# Patient Record
Sex: FEMALE
[De-identification: names, ages, dates, MRNs, and addresses within clinical notes are randomized per-mention and may not be internally consistent; named-entity substitution may affect disease eponyms.]

---

## 2019-06-25 ENCOUNTER — APPOINTMENT (OUTPATIENT)
Dept: DERMATOLOGY | Facility: CLINIC | Age: 24
End: 2019-06-25

## 2019-07-23 ENCOUNTER — APPOINTMENT (OUTPATIENT)
Dept: DERMATOLOGY | Facility: CLINIC | Age: 24
End: 2019-07-23
Payer: COMMERCIAL

## 2019-07-23 ENCOUNTER — TRANSCRIPTION ENCOUNTER (OUTPATIENT)
Age: 24
End: 2019-07-23

## 2019-07-23 VITALS
HEIGHT: 63 IN | WEIGHT: 171 LBS | SYSTOLIC BLOOD PRESSURE: 100 MMHG | BODY MASS INDEX: 30.3 KG/M2 | DIASTOLIC BLOOD PRESSURE: 80 MMHG

## 2019-07-23 DIAGNOSIS — Z91.89 OTHER SPECIFIED PERSONAL RISK FACTORS, NOT ELSEWHERE CLASSIFIED: ICD-10-CM

## 2019-07-23 DIAGNOSIS — Z78.9 OTHER SPECIFIED HEALTH STATUS: ICD-10-CM

## 2019-07-23 PROCEDURE — 99203 OFFICE O/P NEW LOW 30 MIN: CPT | Mod: 25

## 2019-07-23 PROCEDURE — 11900 INJECT SKIN LESIONS </W 7: CPT

## 2019-08-20 ENCOUNTER — APPOINTMENT (OUTPATIENT)
Dept: DERMATOLOGY | Facility: CLINIC | Age: 24
End: 2019-08-20
Payer: COMMERCIAL

## 2019-08-20 PROCEDURE — 11900 INJECT SKIN LESIONS </W 7: CPT

## 2019-08-20 PROCEDURE — 99213 OFFICE O/P EST LOW 20 MIN: CPT | Mod: 25

## 2019-10-10 ENCOUNTER — APPOINTMENT (OUTPATIENT)
Dept: DERMATOLOGY | Facility: CLINIC | Age: 24
End: 2019-10-10

## 2019-10-31 ENCOUNTER — APPOINTMENT (OUTPATIENT)
Dept: DERMATOLOGY | Facility: CLINIC | Age: 24
End: 2019-10-31
Payer: COMMERCIAL

## 2019-10-31 PROCEDURE — 11900 INJECT SKIN LESIONS </W 7: CPT

## 2019-10-31 PROCEDURE — 99213 OFFICE O/P EST LOW 20 MIN: CPT | Mod: 25

## 2019-10-31 NOTE — HISTORY OF PRESENT ILLNESS
[FreeTextEntry1] : new pt keloid pubic [de-identified] : MARY ALDRICH is a 24 year old F here for:\par \par f/u keloid on mons pubis, s/p kenalog injections X 2 sessions with significant improvement. Has noted hypopigmentation of the area on R groin fold

## 2019-10-31 NOTE — ASSESSMENT
[FreeTextEntry1] : 1) Keloid x 2 in pubic area\par - injection #3\par - Education and anticipatory guidance provided. Discussed risks and benefits of serial injections \par -Large keloid and small keloid: Intralesional injection of Kenalog 40 mg/mL (Lot [id number], EXP 8/2020) A total of 1.3 ml was injected (1.0 cc to large lesion and 0.3 to focal area on R groin fold)\par The risks/benefits/alternatives of intralesional steroid injections were reviewed with the patient which include but are not limited to pain, atrophy, and dispigmentation. Intralesional injections were performed in the affected area. The patient tolerated the procedure well.\par -May use EMLA 1 hour prior to appointment for next injection\par \par 2. Hypopigmentation\par due to Kenalog, will improve with time\par \par RTC in 4 weeks\par

## 2019-10-31 NOTE — PHYSICAL EXAM
[Alert] : alert [Oriented x 3] : ~L oriented x 3 [Well Nourished] : well nourished [Conjunctiva Non-injected] : conjunctiva non-injected [No Visual Lymphadenopathy] : no visual  lymphadenopathy [No Clubbing] : no clubbing [No Edema] : no edema [No Bromhidrosis] : no bromhidrosis [No Chromhidrosis] : no chromhidrosis [FreeTextEntry3] :  linear skin colored firm plaque on mons pubis. Lesion on R groin folds with atrophy and hypopigmentation

## 2019-12-12 ENCOUNTER — APPOINTMENT (OUTPATIENT)
Dept: DERMATOLOGY | Facility: CLINIC | Age: 24
End: 2019-12-12
Payer: COMMERCIAL

## 2019-12-12 DIAGNOSIS — L81.9 DISORDER OF PIGMENTATION, UNSPECIFIED: ICD-10-CM

## 2019-12-12 PROCEDURE — 99213 OFFICE O/P EST LOW 20 MIN: CPT | Mod: 25

## 2019-12-12 PROCEDURE — 11900 INJECT SKIN LESIONS </W 7: CPT

## 2019-12-26 ENCOUNTER — TRANSCRIPTION ENCOUNTER (OUTPATIENT)
Age: 24
End: 2019-12-26

## 2020-02-06 ENCOUNTER — RESULT REVIEW (OUTPATIENT)
Age: 25
End: 2020-02-06

## 2020-02-13 ENCOUNTER — APPOINTMENT (OUTPATIENT)
Dept: DERMATOLOGY | Facility: CLINIC | Age: 25
End: 2020-02-13

## 2020-04-26 ENCOUNTER — MESSAGE (OUTPATIENT)
Age: 25
End: 2020-04-26

## 2020-05-12 ENCOUNTER — APPOINTMENT (OUTPATIENT)
Dept: DISASTER EMERGENCY | Facility: CLINIC | Age: 25
End: 2020-05-12

## 2020-12-30 ENCOUNTER — APPOINTMENT (OUTPATIENT)
Dept: DERMATOLOGY | Facility: CLINIC | Age: 25
End: 2020-12-30
Payer: COMMERCIAL

## 2020-12-30 PROCEDURE — 99213 OFFICE O/P EST LOW 20 MIN: CPT

## 2020-12-30 PROCEDURE — 99072 ADDL SUPL MATRL&STAF TM PHE: CPT

## 2021-03-10 ENCOUNTER — RESULT REVIEW (OUTPATIENT)
Age: 26
End: 2021-03-10

## 2021-03-26 ENCOUNTER — APPOINTMENT (OUTPATIENT)
Dept: INTERNAL MEDICINE | Facility: CLINIC | Age: 26
End: 2021-03-26
Payer: COMMERCIAL

## 2021-03-26 ENCOUNTER — APPOINTMENT (OUTPATIENT)
Dept: ENDOCRINOLOGY | Facility: CLINIC | Age: 26
End: 2021-03-26
Payer: COMMERCIAL

## 2021-03-26 VITALS
SYSTOLIC BLOOD PRESSURE: 120 MMHG | OXYGEN SATURATION: 97 % | HEART RATE: 75 BPM | TEMPERATURE: 98.2 F | WEIGHT: 190 LBS | BODY MASS INDEX: 33.66 KG/M2 | HEIGHT: 63 IN | DIASTOLIC BLOOD PRESSURE: 76 MMHG

## 2021-03-26 VITALS
HEIGHT: 63 IN | WEIGHT: 190 LBS | HEART RATE: 75 BPM | TEMPERATURE: 98.6 F | DIASTOLIC BLOOD PRESSURE: 76 MMHG | SYSTOLIC BLOOD PRESSURE: 120 MMHG | OXYGEN SATURATION: 97 % | RESPIRATION RATE: 16 BRPM | BODY MASS INDEX: 33.66 KG/M2

## 2021-03-26 DIAGNOSIS — Z87.898 PERSONAL HISTORY OF OTHER SPECIFIED CONDITIONS: ICD-10-CM

## 2021-03-26 DIAGNOSIS — D64.9 ANEMIA, UNSPECIFIED: ICD-10-CM

## 2021-03-26 DIAGNOSIS — Z82.49 FAMILY HISTORY OF ISCHEMIC HEART DISEASE AND OTHER DISEASES OF THE CIRCULATORY SYSTEM: ICD-10-CM

## 2021-03-26 DIAGNOSIS — Z00.00 ENCOUNTER FOR GENERAL ADULT MEDICAL EXAMINATION W/OUT ABNORMAL FINDINGS: ICD-10-CM

## 2021-03-26 DIAGNOSIS — R79.89 OTHER SPECIFIED ABNORMAL FINDINGS OF BLOOD CHEMISTRY: ICD-10-CM

## 2021-03-26 DIAGNOSIS — Z83.3 FAMILY HISTORY OF DIABETES MELLITUS: ICD-10-CM

## 2021-03-26 PROCEDURE — 99385 PREV VISIT NEW AGE 18-39: CPT | Mod: 25

## 2021-03-26 PROCEDURE — 99244 OFF/OP CNSLTJ NEW/EST MOD 40: CPT | Mod: 25

## 2021-03-26 PROCEDURE — 99072 ADDL SUPL MATRL&STAF TM PHE: CPT

## 2021-03-26 PROCEDURE — 36415 COLL VENOUS BLD VENIPUNCTURE: CPT

## 2021-03-26 RX ORDER — LEVONORGESTREL 19.5 MG/1
19.5 INTRAUTERINE DEVICE INTRAUTERINE
Refills: 0 | Status: ACTIVE | COMMUNITY

## 2021-03-26 RX ORDER — LIDOCAINE AND PRILOCAINE 25; 25 MG/G; MG/G
2.5-2.5 CREAM TOPICAL
Qty: 1 | Refills: 0 | Status: DISCONTINUED | COMMUNITY
Start: 2019-07-23 | End: 2021-03-26

## 2021-03-26 RX ORDER — GLUC/MSM/COLGN2/HYAL/ANTIARTH3 375-375-20
TABLET ORAL
Refills: 0 | Status: ACTIVE | COMMUNITY

## 2021-03-26 RX ORDER — BETAMETHASONE DIPROPIONATE 0.5 MG/G
0.05 OINTMENT, AUGMENTED TOPICAL
Qty: 1 | Refills: 0 | Status: DISCONTINUED | COMMUNITY
Start: 2020-12-30 | End: 2021-03-26

## 2021-03-26 NOTE — REVIEW OF SYSTEMS
[Negative] : Integumentary [FreeTextEntry2] : weight gain over a few years [FreeTextEntry8] : irregular periods, spotting

## 2021-03-26 NOTE — PLAN
[FreeTextEntry1] : PCOS, low T4\par -recent labs done with GYN 3/18\par -off metformin\par -endocrine evaluation scheduled for later today\par \par Anemia\par -mild, Hb between 11.4-11.5\par -continue daily iron supplementation\par -next labs would check iron studies and UA\par -likely 2/2 consistent spotting\par \par HCM\par -depression screen negative\par -did not get flu shot this past season\par -pap smear 3/21\par -defers STD testing, not sexually active

## 2021-03-26 NOTE — HEALTH RISK ASSESSMENT
[No] : In the past 12 months have you used drugs other than those required for medical reasons? No [0] : 2) Feeling down, depressed, or hopeless: Not at all (0) [Patient reported PAP Smear was normal] : Patient reported PAP Smear was normal [HIV test declined] : HIV test declined [Hepatitis C test declined] : Hepatitis C test declined [None] : None [With Family] : lives with family [Employed] : employed [Feels Safe at Home] : Feels safe at home [] : No [[id number]] : 0 [Change in mental status noted] : No change in mental status noted [Language] : denies difficulty with language [Sexually Active] : not sexually active [Reports changes in hearing] : Reports no changes in hearing [Reports changes in vision] : Reports no changes in vision [PapSmearDate] : 03/21 [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

## 2021-03-26 NOTE — HISTORY OF PRESENT ILLNESS
[FreeTextEntry1] : establish care, CPE [de-identified] : 24yo F with PMH of PCOS presents to establish care.\par In 2019, her periods began to become very irregular and she was skipping months, even when she had her period the flow was not her normal. She then did not have a period for a few months at all. She established with GYN in Feb 2020 and had extensive blood work revealed that she has PCOS. She was started on metformin and has had close monitoring with blood work since then. She was started on birth control as well but caused acne, so then Kylena IUD was inserted Eileen 2020. Most recent blood work a few weeks ago was done and she was told to stop metformin. Since her last full menstrual cycle October 2019, she has only been getting spotting which is very frequent.\par In her blood work her TSH is noted to be within normal range but it seems free T4 is consistently low normal or low. She does suffer from chronic constipation, notes some progressive weight gain in the past few years. She has an appointment with endocrinologist Dr Ortiz for later this afternoon.\par She also has a mild anemia, initially her MCV was high and she was started on vitamin B12. Then the level of B12 went very high so it was stopped. States this questionable anemia has been present for years, currently taking iron but was not before 2/2 constipation. Denies blood in urine or stool.

## 2021-03-27 PROBLEM — R79.89 LOW SERUM T4 LEVEL: Status: ACTIVE | Noted: 2021-03-26

## 2021-03-27 NOTE — HISTORY OF PRESENT ILLNESS
[FreeTextEntry1] : Ms. UNDERWOOD  is a 25 year old  female  who presents for initial endocrine evaluation. She presents via the kind courtesy of Dr. Lawrence [facility]. She presents with regard to a history of low T4, insulin resistance, and PCOS as revealed by recent lab workup. Recent labs revealed TSH 3.47, Free T4 0.7 Prolactin was upper normal but was elevated at 42 last year.\par \par Her menses became irregular in 2019. After following up with gyn, she was diagnosed with PCOS, with a ring of pearls pattern revealed by ultrasound. She states her testosterone has been wnl. She does admit to facial and jorge umbilical  hormonal hair growth. She does admit to focal scalp hair loss. She was previously on Metformin 500 mg, which was later increased to 1000 mg, however, she has stopped taking Metformin thinking that because her recent insulin level was still elevated at value of 91-that the metformin was not working. Too, was on OCP- however she felt it contributing to worsening of her acne. Now on Kylenna IUD since May 2020.\par At present, she is concerned about weight gain since 2018. She has gained about 20 labs over the past 2 months. When she began Metformin around the beginning of the pandemic, she was 174 lb with today's weight at 190 lbs.\par She does c/o fatigue and is typically warm.\par Additional medical history includes that of heart murmur as a child, mild anemia. Taking iron and Vitamin C. Off Vitamin B 12. \par FMHx: mother has diabetes, father has HTN. Denies thyroid disease. \par SH: works as clinical research coordinator. \par

## 2021-03-30 LAB
25(OH)D3 SERPL-MCNC: 19.7 NG/ML
MAGNESIUM SERPL-MCNC: 2.1 MG/DL
PROLACTIN SERPL-MCNC: 22.7 NG/ML
T3FREE SERPL-MCNC: 3.29 PG/ML
T4 FREE SERPL-MCNC: 0.8 NG/DL
THYROGLOB AB SERPL-ACNC: <20 IU/ML
THYROPEROXIDASE AB SERPL IA-ACNC: <10 IU/ML
TSH SERPL-ACNC: 1.61 UIU/ML

## 2021-04-02 ENCOUNTER — NON-APPOINTMENT (OUTPATIENT)
Age: 26
End: 2021-04-02

## 2021-05-24 ENCOUNTER — APPOINTMENT (OUTPATIENT)
Dept: INTERNAL MEDICINE | Facility: CLINIC | Age: 26
End: 2021-05-24

## 2021-06-01 ENCOUNTER — NON-APPOINTMENT (OUTPATIENT)
Age: 26
End: 2021-06-01

## 2021-06-01 RX ORDER — PEN NEEDLE, DIABETIC 29 G X1/2"
32G X 4 MM NEEDLE, DISPOSABLE MISCELLANEOUS
Qty: 12 | Refills: 1 | Status: ACTIVE | COMMUNITY
Start: 2021-06-01 | End: 1900-01-01

## 2021-06-22 ENCOUNTER — APPOINTMENT (OUTPATIENT)
Dept: ENDOCRINOLOGY | Facility: CLINIC | Age: 26
End: 2021-06-22
Payer: COMMERCIAL

## 2021-06-22 VITALS
HEART RATE: 69 BPM | WEIGHT: 176 LBS | TEMPERATURE: 98.2 F | DIASTOLIC BLOOD PRESSURE: 82 MMHG | OXYGEN SATURATION: 98 % | HEIGHT: 63 IN | BODY MASS INDEX: 31.18 KG/M2 | SYSTOLIC BLOOD PRESSURE: 122 MMHG

## 2021-06-22 DIAGNOSIS — E55.9 VITAMIN D DEFICIENCY, UNSPECIFIED: ICD-10-CM

## 2021-06-22 PROCEDURE — 99072 ADDL SUPL MATRL&STAF TM PHE: CPT

## 2021-06-22 PROCEDURE — 99214 OFFICE O/P EST MOD 30 MIN: CPT | Mod: 25

## 2021-06-22 PROCEDURE — 36415 COLL VENOUS BLD VENIPUNCTURE: CPT

## 2021-06-22 RX ORDER — SEMAGLUTIDE 1.34 MG/ML
2 INJECTION, SOLUTION SUBCUTANEOUS
Qty: 12 | Refills: 1 | Status: ACTIVE | COMMUNITY
Start: 2021-03-26 | End: 1900-01-01

## 2021-06-22 NOTE — HISTORY OF PRESENT ILLNESS
[FreeTextEntry1] : Ms. UNDERWOOD is a 25 year old female who presents with regard to a history of low T4, insulin resistance, and PCOS. \par \par Her menses became irregular in 2019. After following up with gyn, she was diagnosed with PCOS, with a ring of pearls pattern revealed by ultrasound. She states her testosterone has been wnl. She does admit to facial and jorge umbilical hormonal hair growth. She does admit to focal scalp hair loss. She was previously on Metformin 500 mg, which was later increased to 1000 mg, however, she has stopped taking Metformin thinking that because her recent insulin level was still elevated at value of 91-that the metformin was not working. Too, was on OCP- however she felt it contributing to worsening of her acne. Now on Kylenna IUD since May 2020. Menses regular, some cycles lighter or heavier than others. She is not looking to conceive at this time. \par She is taking Metformin 500 mg BID intermittently. Pt did not note hair re-growth on Metformin. \par \par With regard to weight gain present since 2018. Pt is taking Ozempic 0.5 mg weekly, and has noted about 14 lb weight loss since last visit. \par \par Additional medical history includes that of heart murmur as a child, mild anemia, vitamin d deficiency. Taking iron and Vitamin C. Taking vitamin D3 50,000 iu weekly. \par FMHx: mother has diabetes, father has HTN. Denies thyroid disease.

## 2021-06-24 ENCOUNTER — NON-APPOINTMENT (OUTPATIENT)
Age: 26
End: 2021-06-24

## 2021-06-24 RX ORDER — ERGOCALCIFEROL 1.25 MG/1
1.25 MG CAPSULE ORAL
Qty: 12 | Refills: 0 | Status: DISCONTINUED | COMMUNITY
Start: 2021-04-05 | End: 2021-06-24

## 2021-06-24 RX ORDER — METFORMIN HYDROCHLORIDE 500 MG/1
500 TABLET, COATED ORAL TWICE DAILY
Qty: 180 | Refills: 1 | Status: DISCONTINUED | COMMUNITY
Start: 2021-03-26 | End: 2021-06-24

## 2021-08-02 ENCOUNTER — APPOINTMENT (OUTPATIENT)
Dept: DERMATOLOGY | Facility: CLINIC | Age: 26
End: 2021-08-02
Payer: COMMERCIAL

## 2021-08-02 VITALS — HEIGHT: 63 IN | WEIGHT: 173 LBS | BODY MASS INDEX: 30.65 KG/M2

## 2021-08-02 DIAGNOSIS — L81.8 OTHER SPECIFIED DISORDERS OF PIGMENTATION: ICD-10-CM

## 2021-08-02 PROCEDURE — 99213 OFFICE O/P EST LOW 20 MIN: CPT | Mod: 25

## 2021-08-02 PROCEDURE — 11900 INJECT SKIN LESIONS </W 7: CPT

## 2021-08-12 LAB
24R-OH-CALCIDIOL SERPL-MCNC: 83.7 PG/ML
25(OH)D3 SERPL-MCNC: 43.3 NG/ML
ALBUMIN SERPL ELPH-MCNC: 5 G/DL
ALP BLD-CCNC: 58 U/L
ALT SERPL-CCNC: 16 U/L
ANION GAP SERPL CALC-SCNC: 13 MMOL/L
AST SERPL-CCNC: 19 U/L
BILIRUB SERPL-MCNC: 0.5 MG/DL
BUN SERPL-MCNC: 7 MG/DL
CALCIUM SERPL-MCNC: 10 MG/DL
CHLORIDE SERPL-SCNC: 100 MMOL/L
CO2 SERPL-SCNC: 22 MMOL/L
CREAT SERPL-MCNC: 0.6 MG/DL
GLUCOSE SERPL-MCNC: 71 MG/DL
POTASSIUM SERPL-SCNC: 4.1 MMOL/L
PROT SERPL-MCNC: 8 G/DL
SODIUM SERPL-SCNC: 135 MMOL/L
VIT B12 SERPL-MCNC: 1200 PG/ML

## 2021-08-27 ENCOUNTER — NON-APPOINTMENT (OUTPATIENT)
Age: 26
End: 2021-08-27

## 2021-08-27 ENCOUNTER — APPOINTMENT (OUTPATIENT)
Dept: DERMATOLOGY | Facility: CLINIC | Age: 26
End: 2021-08-27
Payer: COMMERCIAL

## 2021-08-27 ENCOUNTER — LABORATORY RESULT (OUTPATIENT)
Age: 26
End: 2021-08-27

## 2021-08-27 PROCEDURE — 11426 EXC H-F-NK-SP B9+MARG >4 CM: CPT

## 2021-08-27 PROCEDURE — 12044 INTMD RPR N-HF/GENIT7.6-12.5: CPT

## 2021-08-27 RX ORDER — CEPHALEXIN 500 MG/1
500 TABLET ORAL 3 TIMES DAILY
Qty: 21 | Refills: 0 | Status: ACTIVE | COMMUNITY
Start: 2021-08-27 | End: 1900-01-01

## 2021-09-09 ENCOUNTER — NON-APPOINTMENT (OUTPATIENT)
Age: 26
End: 2021-09-09

## 2021-09-09 ENCOUNTER — APPOINTMENT (OUTPATIENT)
Dept: DERMATOLOGY | Facility: CLINIC | Age: 26
End: 2021-09-09
Payer: COMMERCIAL

## 2021-09-09 PROCEDURE — 99024 POSTOP FOLLOW-UP VISIT: CPT

## 2021-09-09 PROCEDURE — 11900 INJECT SKIN LESIONS </W 7: CPT

## 2021-09-16 ENCOUNTER — NON-APPOINTMENT (OUTPATIENT)
Age: 26
End: 2021-09-16

## 2021-09-17 ENCOUNTER — APPOINTMENT (OUTPATIENT)
Dept: DERMATOLOGY | Facility: CLINIC | Age: 26
End: 2021-09-17
Payer: COMMERCIAL

## 2021-09-17 PROCEDURE — 11900 INJECT SKIN LESIONS </W 7: CPT

## 2021-09-17 PROCEDURE — 99213 OFFICE O/P EST LOW 20 MIN: CPT | Mod: 25

## 2021-09-27 ENCOUNTER — APPOINTMENT (OUTPATIENT)
Dept: DERMATOLOGY | Facility: CLINIC | Age: 26
End: 2021-09-27

## 2021-10-22 ENCOUNTER — APPOINTMENT (OUTPATIENT)
Dept: DERMATOLOGY | Facility: CLINIC | Age: 26
End: 2021-10-22
Payer: COMMERCIAL

## 2021-10-22 PROCEDURE — 11900 INJECT SKIN LESIONS </W 7: CPT | Mod: GC

## 2021-10-22 PROCEDURE — 99213 OFFICE O/P EST LOW 20 MIN: CPT | Mod: GC,25

## 2021-12-20 ENCOUNTER — APPOINTMENT (OUTPATIENT)
Dept: ENDOCRINOLOGY | Facility: CLINIC | Age: 26
End: 2021-12-20

## 2022-01-06 ENCOUNTER — APPOINTMENT (OUTPATIENT)
Dept: DERMATOLOGY | Facility: CLINIC | Age: 27
End: 2022-01-06

## 2022-06-03 ENCOUNTER — RESULT REVIEW (OUTPATIENT)
Age: 27
End: 2022-06-03

## 2022-10-10 ENCOUNTER — NON-APPOINTMENT (OUTPATIENT)
Age: 27
End: 2022-10-10

## 2022-10-12 ENCOUNTER — LABORATORY RESULT (OUTPATIENT)
Age: 27
End: 2022-10-12

## 2022-10-12 ENCOUNTER — NON-APPOINTMENT (OUTPATIENT)
Age: 27
End: 2022-10-12

## 2022-10-13 ENCOUNTER — APPOINTMENT (OUTPATIENT)
Dept: DERMATOLOGY | Facility: CLINIC | Age: 27
End: 2022-10-13

## 2022-10-13 DIAGNOSIS — L91.0 HYPERTROPHIC SCAR: ICD-10-CM

## 2022-10-13 PROCEDURE — 11900 INJECT SKIN LESIONS </W 7: CPT | Mod: 59

## 2022-10-13 PROCEDURE — 11423 EXC H-F-NK-SP B9+MARG 2.1-3: CPT

## 2022-10-13 PROCEDURE — 12042 INTMD RPR N-HF/GENIT2.6-7.5: CPT

## 2022-10-13 RX ORDER — CEPHALEXIN 500 MG/1
500 TABLET ORAL EVERY 8 HOURS
Qty: 21 | Refills: 0 | Status: ACTIVE | COMMUNITY
Start: 2022-10-13 | End: 1900-01-01

## 2022-10-17 PROBLEM — L91.0 HYPERTROPHIC SCAR: Status: ACTIVE | Noted: 2022-10-17

## 2022-10-21 ENCOUNTER — NON-APPOINTMENT (OUTPATIENT)
Age: 27
End: 2022-10-21

## 2022-10-27 ENCOUNTER — APPOINTMENT (OUTPATIENT)
Dept: DERMATOLOGY | Facility: CLINIC | Age: 27
End: 2022-10-27

## 2022-10-27 PROCEDURE — 99212 OFFICE O/P EST SF 10 MIN: CPT | Mod: 25

## 2022-10-27 PROCEDURE — 11900 INJECT SKIN LESIONS </W 7: CPT

## 2022-12-08 ENCOUNTER — APPOINTMENT (OUTPATIENT)
Dept: DERMATOLOGY | Facility: CLINIC | Age: 27
End: 2022-12-08

## 2022-12-08 DIAGNOSIS — L29.9 PRURITUS, UNSPECIFIED: ICD-10-CM

## 2022-12-08 DIAGNOSIS — L91.0 HYPERTROPHIC SCAR: ICD-10-CM

## 2022-12-08 PROCEDURE — 11900 INJECT SKIN LESIONS </W 7: CPT

## 2022-12-08 PROCEDURE — 99212 OFFICE O/P EST SF 10 MIN: CPT | Mod: 25

## 2023-01-19 ENCOUNTER — APPOINTMENT (OUTPATIENT)
Dept: DERMATOLOGY | Facility: CLINIC | Age: 28
End: 2023-01-19
Payer: COMMERCIAL

## 2023-01-19 PROCEDURE — 11900 INJECT SKIN LESIONS </W 7: CPT

## 2023-01-20 NOTE — PHYSICAL EXAM
[Alert] : alert [Oriented x 3] : ~L oriented x 3 [Well Nourished] : well nourished [Conjunctiva Non-injected] : conjunctiva non-injected [No Visual Lymphadenopathy] : no visual  lymphadenopathy [No Clubbing] : no clubbing [No Edema] : no edema [No Bromhidrosis] : no bromhidrosis [No Chromhidrosis] : no chromhidrosis [Hair] : Hair [Scalp] : Scalp [Face] : Face [Nose] : Nose [Eyelids] : Eyelids [Ears] : Ears [Neck] : Neck [R Leg] : R Leg [L Leg] : L Leg [FreeTextEntry3] : -right mons pubis with linear scar horizontally and small vertical scar. slightly raised areas on both

## 2023-01-20 NOTE — ASSESSMENT
[FreeTextEntry1] : 1) Keloid, Right mons pubis \par \par Intralesional Injection of Triamcinolone (ILTAC), 20mg/cc performed today\par - Site:right mons pubis\par - 2 lesions injected with intralesional triamcinolone \par - 0.6cc injected total\par - The risks/benefits/alternatives of intralesional injections were reviewed with the patient which include but are not limited to pain, atrophy, and dyschromia. Intralesional injections were performed in the affected area. The patient tolerated the procedure well.\par \par RTC in 4-6 weeks. Discussed other options including other intralesional, excision, CO2 laser if not improving after 2-3 treatments\par

## 2023-01-20 NOTE — HISTORY OF PRESENT ILLNESS
[FreeTextEntry1] : keloid Right Mons Pubis [de-identified] : 12/8/22\par \par Ms. MARY UNDERWOOD is a 27 year old F who presents for f/u of keloid on right mons pubis s/p excision October 2022\par \par Pt previously had excision of keloid on upper mons pubis in August 2021, had subsequent keloid develop on a different area right mons pubis that was excised in October 2022. 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 No tobacco or alcohol. \par \par The patient's review of systems questionnaire was reviewed. Education needs were identified. There were no barriers to learning.\par \par

## 2023-03-16 ENCOUNTER — APPOINTMENT (OUTPATIENT)
Dept: ENDOCRINOLOGY | Facility: CLINIC | Age: 28
End: 2023-03-16
Payer: COMMERCIAL

## 2023-03-16 DIAGNOSIS — E66.9 OBESITY, UNSPECIFIED: ICD-10-CM

## 2023-03-16 PROCEDURE — 99205 OFFICE O/P NEW HI 60 MIN: CPT

## 2023-03-16 RX ORDER — SPIRONOLACTONE 50 MG/1
50 TABLET ORAL DAILY
Qty: 30 | Refills: 3 | Status: ACTIVE | COMMUNITY
Start: 2023-03-16 | End: 1900-01-01

## 2023-03-17 PROBLEM — E66.9 OBESITY: Status: ACTIVE | Noted: 2021-03-26

## 2023-03-17 NOTE — ASSESSMENT
[FreeTextEntry1] : #PCOS\par \par This is a 27  year old female with previous history of hyperandrogenism here for evaluation for PCOS.\par \par Given the [place] criteria she meets 3 /3,  she has menstrual irregularity and hyperandrogenism and evidence of ovarian cysts on pelvic ultrasound.  Will be further evaluating for other causes such as non-classical CAH, hyperprolactinemia and hypercortisolemia. Metabolic parameters such as diabetes and lipid profile will be screened as well. \par -Patient is currently on IUD-did not like how she felt on OCPs in the past\par -Tried metformin in the past however found to be ineffective\par -Tried Ozempic in the past and did lose weight however she did not like the appetite suppression associated with the medication\par Plan:\par -Check testosterone, prolactin, DHEA-s, 17-OHP, androsteindione, LH, FSH, HgA1C, lipid profile, am cortisol, TSH\par -Will evaluate necessity for Metformin after checking HgA1C\par -Patient would like to get started on spironolactone for hirsutism.  We will prescribe patient 50 mg twice daily\par -Will call back with results\par \par -Side effects of spironolactone discussed in details including dizziness, dehydration and lightheadedness.  Advised patient to drink plenty of water.  Also discussed that patient cannot get pregnant while she is on this medication.  Discussed side effects of hyperkalemia\par \par We discussed at length about management of underlying metabolic abnormalities and reduction of type 2 diabetes.  Explained that patients with PCOS typically have a higher incidence of insulin resistance and diet and exercise have been shown to be an effective modality to help in improvement of the metabolic parameters along with restoring ovulation cycles and improvement in hirsutism.  Patient understands that patients with PCOS are typically at higher risk of obstructive sleep apnea and nonalcoholic steatohepatitis.  Chronic anovulation can put patient at a higher risk for endometrial hyperplasia and carcinoma. \par \par -Follow up in 6 months\par \par #Abnormal TFTs\par -Patient also previously noted to have a normal TSH with a mildly decreased free T4\par -We will need to repeat TFTs\par \par #Obesity\par -Patient understands that weight loss is a treatment modality for PCOS\par -She tried Ozempic in the past however she did not like the appetite suppression associated with that.\par -She would like to try lifestyle modifications at this time\par

## 2023-03-17 NOTE — HISTORY OF PRESENT ILLNESS
[FreeTextEntry1] : #PCOS\par #Insulin resistance \par Diagnosis: 2019 when patient's periods became irregular. prior to that occasionally irregular \par Menarche: 8\par Period cycles: not having any on IUD \par Patient was on Ozempic for 4-6 months and lost 20lbs. \par On birth control? patient has IUD (Kylenna) \par Previously patient was on MFM 1000mg \par Follows with OB/GYN? June 2022 \par Last pelvic US: Previously found to have ring of pearls pattern on US in 2020. \par Had one in Dec 2022 \par \par Prior pregnancies: Denies\par Plans for pregnancy: Denies \par \par Hirsutism: Yes, on chin and abdomen \par FH: Denies \par \par History of diabetes? No  \par History of hypercholesterolemia? yes \par \par Labs:\par June 2022\par HgbA1c of 5.2%\par TSH 2.73\par FT4 0.7\par Insulin 57\par \par Patient is currently applying for med school\par \par #Hypercholesterolemia\par Total cholesterol 257\par HDL 40\par \par \par

## 2023-03-30 DIAGNOSIS — E78.1 PURE HYPERGLYCERIDEMIA: ICD-10-CM

## 2023-03-30 LAB
17OHP SERPL-MCNC: 73 NG/DL
25(OH)D3 SERPL-MCNC: 25.4 NG/ML
ALBUMIN SERPL ELPH-MCNC: 4.8 G/DL
ALP BLD-CCNC: 69 U/L
ALT SERPL-CCNC: 22 U/L
ANDROST SERPL-MCNC: 150 NG/DL
ANION GAP SERPL CALC-SCNC: 14 MMOL/L
AST SERPL-CCNC: 26 U/L
BASOPHILS # BLD AUTO: 0.03 K/UL
BASOPHILS NFR BLD AUTO: 0.4 %
BILIRUB SERPL-MCNC: 0.8 MG/DL
BUN SERPL-MCNC: 14 MG/DL
CALCIUM SERPL-MCNC: 10.1 MG/DL
CHLORIDE SERPL-SCNC: 102 MMOL/L
CHOLEST SERPL-MCNC: 275 MG/DL
CO2 SERPL-SCNC: 22 MMOL/L
CORTIS SERPL-MCNC: 23.9 UG/DL
CREAT SERPL-MCNC: 0.76 MG/DL
DHEA-SULFATE, SERUM: 115 UG/DL
EGFR: 109 ML/MIN/1.73M2
EOSINOPHIL # BLD AUTO: 0.09 K/UL
EOSINOPHIL NFR BLD AUTO: 1.1 %
ESTIMATED AVERAGE GLUCOSE: 94 MG/DL
FSH SERPL-MCNC: 3.1 IU/L
GLUCOSE SERPL-MCNC: 96 MG/DL
HBA1C MFR BLD HPLC: 4.9 %
HCG SERPL-MCNC: <1 MIU/ML
HCT VFR BLD CALC: 34.6 %
HDLC SERPL-MCNC: 53 MG/DL
HGB BLD-MCNC: 11 G/DL
IGF-1 INTERP: NORMAL
IGF-I BLD-MCNC: 266 NG/ML
IMM GRANULOCYTES NFR BLD AUTO: 0.2 %
LDLC SERPL CALC-MCNC: 195 MG/DL
LH SERPL-ACNC: 8.9 IU/L
LYMPHOCYTES # BLD AUTO: 3.11 K/UL
LYMPHOCYTES NFR BLD AUTO: 38 %
MAN DIFF?: NORMAL
MCHC RBC-ENTMCNC: 31.8 GM/DL
MCHC RBC-ENTMCNC: 31.9 PG
MCV RBC AUTO: 100.3 FL
MONOCYTES # BLD AUTO: 0.77 K/UL
MONOCYTES NFR BLD AUTO: 9.4 %
NEUTROPHILS # BLD AUTO: 4.16 K/UL
NEUTROPHILS NFR BLD AUTO: 50.9 %
NONHDLC SERPL-MCNC: 222 MG/DL
PLATELET # BLD AUTO: 264 K/UL
POTASSIUM SERPL-SCNC: 4.3 MMOL/L
PROLACTIN SERPL-MCNC: 53.1 NG/ML
PROT SERPL-MCNC: 7.5 G/DL
RBC # BLD: 3.45 M/UL
RBC # FLD: 12 %
SODIUM SERPL-SCNC: 137 MMOL/L
T3 SERPL-MCNC: 118 NG/DL
T4 FREE SERPL-MCNC: 0.8 NG/DL
TESTOST FREE SERPL-MCNC: 1.2 PG/ML
TESTOST SERPL-MCNC: 8 NG/DL
THYROGLOB AB SERPL-ACNC: <20 IU/ML
THYROPEROXIDASE AB SERPL IA-ACNC: <10 IU/ML
TRIGL SERPL-MCNC: 132 MG/DL
TSH SERPL-ACNC: 5.58 UIU/ML
WBC # FLD AUTO: 8.18 K/UL

## 2023-04-12 ENCOUNTER — APPOINTMENT (OUTPATIENT)
Dept: DERMATOLOGY | Facility: CLINIC | Age: 28
End: 2023-04-12

## 2023-04-14 ENCOUNTER — NON-APPOINTMENT (OUTPATIENT)
Age: 28
End: 2023-04-14

## 2023-04-14 LAB
MONOMERIC PROLACTIN (ICMA)*: 21.6 NG/ML
PERCENT MACROPROLACTIN: 19 %
PROLACTIN SERPL-MCNC: 24.6 NG/ML
PROLACTIN SERPL-MCNC: 26 NG/ML
PROLACTIN, SERUM (ICMA)*: 26.7 NG/ML
T4 FREE SERPL-MCNC: 0.9 NG/DL
TSH SERPL-ACNC: 3.57 UIU/ML

## 2023-05-31 ENCOUNTER — APPOINTMENT (OUTPATIENT)
Dept: ENDOCRINOLOGY | Facility: CLINIC | Age: 28
End: 2023-05-31

## 2023-06-02 ENCOUNTER — NON-APPOINTMENT (OUTPATIENT)
Age: 28
End: 2023-06-02

## 2023-06-08 ENCOUNTER — NON-APPOINTMENT (OUTPATIENT)
Age: 28
End: 2023-06-08

## 2023-06-08 LAB
CHOLEST SERPL-MCNC: 162 MG/DL
HDLC SERPL-MCNC: 45 MG/DL
LDLC SERPL CALC-MCNC: 99 MG/DL
NONHDLC SERPL-MCNC: 118 MG/DL
PROLACTIN SERPL-MCNC: 31.6 NG/ML
T3 SERPL-MCNC: 161 NG/DL
T4 FREE SERPL-MCNC: 1 NG/DL
TRIGL SERPL-MCNC: 94 MG/DL
TSH SERPL-ACNC: 3.47 UIU/ML

## 2023-06-26 ENCOUNTER — APPOINTMENT (OUTPATIENT)
Dept: MRI IMAGING | Facility: CLINIC | Age: 28
End: 2023-06-26

## 2023-06-26 ENCOUNTER — OUTPATIENT (OUTPATIENT)
Dept: OUTPATIENT SERVICES | Facility: HOSPITAL | Age: 28
LOS: 1 days | End: 2023-06-26
Payer: COMMERCIAL

## 2023-06-26 DIAGNOSIS — E22.1 HYPERPROLACTINEMIA: ICD-10-CM

## 2023-06-26 PROCEDURE — 70553 MRI BRAIN STEM W/O & W/DYE: CPT

## 2023-06-26 PROCEDURE — A9585: CPT

## 2023-06-26 PROCEDURE — 70553 MRI BRAIN STEM W/O & W/DYE: CPT | Mod: 26

## 2023-07-05 ENCOUNTER — RX RENEWAL (OUTPATIENT)
Age: 28
End: 2023-07-05

## 2023-07-05 RX ORDER — ROSUVASTATIN CALCIUM 5 MG/1
5 TABLET, FILM COATED ORAL
Qty: 30 | Refills: 2 | Status: ACTIVE | COMMUNITY
Start: 2023-03-30 | End: 1900-01-01

## 2023-08-05 ENCOUNTER — LABORATORY RESULT (OUTPATIENT)
Age: 28
End: 2023-08-05

## 2023-08-06 LAB
ALBUMIN SERPL ELPH-MCNC: 4.6 G/DL
ALP BLD-CCNC: 68 U/L
ALT SERPL-CCNC: 15 U/L
ANION GAP SERPL CALC-SCNC: 11 MMOL/L
AST SERPL-CCNC: 17 U/L
BILIRUB SERPL-MCNC: 0.5 MG/DL
BUN SERPL-MCNC: 12 MG/DL
CALCIUM SERPL-MCNC: 9.6 MG/DL
CHLORIDE SERPL-SCNC: 102 MMOL/L
CHOLEST SERPL-MCNC: 169 MG/DL
CO2 SERPL-SCNC: 26 MMOL/L
CREAT SERPL-MCNC: 0.77 MG/DL
EGFR: 107 ML/MIN/1.73M2
GLUCOSE SERPL-MCNC: 97 MG/DL
HDLC SERPL-MCNC: 42 MG/DL
LDLC SERPL CALC-MCNC: 100 MG/DL
NONHDLC SERPL-MCNC: 127 MG/DL
POTASSIUM SERPL-SCNC: 4.2 MMOL/L
PROLACTIN SERPL-MCNC: 2.5 NG/ML
PROT SERPL-MCNC: 7.1 G/DL
SODIUM SERPL-SCNC: 139 MMOL/L
TRIGL SERPL-MCNC: 155 MG/DL
TSH SERPL-ACNC: 4.36 UIU/ML

## 2023-08-07 ENCOUNTER — NON-APPOINTMENT (OUTPATIENT)
Age: 28
End: 2023-08-07

## 2023-08-08 ENCOUNTER — APPOINTMENT (OUTPATIENT)
Dept: ENDOCRINOLOGY | Facility: CLINIC | Age: 28
End: 2023-08-08
Payer: COMMERCIAL

## 2023-08-08 PROCEDURE — 99214 OFFICE O/P EST MOD 30 MIN: CPT | Mod: 95

## 2023-08-09 NOTE — HISTORY OF PRESENT ILLNESS
[FreeTextEntry1] : This visit was provided via telehealth using real-time 2-way audio visual technology. The patient, MARY UNDERWOOD, was located at home, [address] [place] [address], at the time of the visit.  The provider, STEFANY FONSECA DO, was located at the medical office located in [address] [place] [phone] at the time of the visit. The patient, MARY UNDERWOOD and Provider participated in the telehealth encounter.   #PCOS Diagnosis: 2019 when patient's periods became irregular. prior to that occasionally irregular  Menarche: 8 Period cycles: not having any on IUD  Patient was on Ozempic for 4-6 months and lost 20lbs.  On birth control? patient has IUD (Kylenna)  Previously patient was on MFM 1000mg  Follows with OB/GYN? June 2022  Last pelvic US: Previously found to have ring of pearls pattern on US in 2020.  Had one in Dec 2022   #Hyperprolactinemia  - Patient noted to have elevated prolactin level in the 30s-50s.  - Patient had a tiny 2mm hypoenhancement focus showing a microadenoma   #Subclincal hypothyroidism - TSH mildly elevated to 4.36  - FT4 0.9  Prior pregnancies: Denies Plans for pregnancy: Denies   Hirsutism: Yes, on chin and abdomen  FH: Denies   History of diabetes? No   History of hypercholesterolemia? yes   Testo 8.0  HgbA1c of 5.2% TSH 2.73 FT4 0.7 Insulin 57  Patient is currently applying for med school  #Hypercholesterolemia Total cholesterol 257 HDL 40  prior to treatment -->100 after treatment  Started on Crestor 5mg    Detail Level: Zone Samples Given: Moisturizer and sunscreen Render In Strict Bullet Format?: No Continue Regimen: Epiduo Forte apply to full face nightly if tolerated Initiate Treatment: fluocinolone 0.01 % topical body oil \\nApply to scalp bid for 2 weeks, then weekends only

## 2023-08-09 NOTE — ASSESSMENT
[FreeTextEntry1] : #Hyperprolactinemia - Continue with Cabergoline.  - Patient's prolactin now slightly low - Advise to decrease Cabergoline to 0.25mg weekly   #PCOS - Currently on IUD- didn't like how she felt on OCPs in the past  -Tried metformin in the past however found to be ineffective  #Subclincial hypothyroidism - Possible in the setting of elevated prolactin - Will continue to follow for now as prolactin levels are slowly improving   #Hypercholesterolemia - continue with Crestor 5mg daily

## 2023-09-25 LAB
CHOLEST SERPL-MCNC: 153 MG/DL
HDLC SERPL-MCNC: 51 MG/DL
LDLC SERPL CALC-MCNC: 88 MG/DL
NONHDLC SERPL-MCNC: 102 MG/DL
PROLACTIN SERPL-MCNC: 5 NG/ML
TRIGL SERPL-MCNC: 69 MG/DL
TSH SERPL-ACNC: 2.53 UIU/ML

## 2023-12-13 ENCOUNTER — APPOINTMENT (OUTPATIENT)
Dept: ENDOCRINOLOGY | Facility: CLINIC | Age: 28
End: 2023-12-13
Payer: COMMERCIAL

## 2023-12-13 VITALS
HEART RATE: 88 BPM | WEIGHT: 184 LBS | DIASTOLIC BLOOD PRESSURE: 70 MMHG | OXYGEN SATURATION: 96 % | TEMPERATURE: 96 F | BODY MASS INDEX: 32.6 KG/M2 | HEIGHT: 63 IN | SYSTOLIC BLOOD PRESSURE: 110 MMHG

## 2023-12-13 DIAGNOSIS — E78.00 PURE HYPERCHOLESTEROLEMIA, UNSPECIFIED: ICD-10-CM

## 2023-12-13 PROCEDURE — 99214 OFFICE O/P EST MOD 30 MIN: CPT

## 2023-12-14 PROBLEM — E78.00 HYPERCHOLESTEROLEMIA: Status: ACTIVE | Noted: 2023-08-09

## 2023-12-14 NOTE — ASSESSMENT
[FreeTextEntry1] : #Hyperprolactinemia - Continue with Cabergoline 0.25mg weely  - Will get prolactin checked at 8 am - Periods now regular since starting the medication   #PCOS - Currently on IUD- didn't like how she felt on OCPs in the past -Tried metformin in the past however found to be ineffective - Lifestyle modiciations for now  #Subclincial hypothyroidism - Possible in the setting of elevated prolactin - Last TSH was at goal   #Hypercholesterolemia - continue with Crestor 5mg daily. - Will get a lipid panel   Patient is also pursuing a post-bach at [facility] next year. Will need to get a tele health appointment in April prior to her leaving. patient will let us know if she will continue to follow here otherwise will refer her to endocrinologist in [place].

## 2023-12-14 NOTE — HISTORY OF PRESENT ILLNESS
[FreeTextEntry1] :  #PCOS Diagnosis: 2019 when patient's periods became irregular. prior to that occasionally irregular  Menarche: 8 Period cycles:  on IUD - now getting periods regularly since starting cabergoline  Patient was on Ozempic for 4-6 months and lost 20lbs.  On birth control? patient has IUD (Kylenna)  Previously patient was on MFM 1000mg  Follows with OB/GYN? June 2022  Last pelvic US: Previously found to have ring of pearls pattern on US in 2020.  Had one in Dec 2022   #Hyperprolactinemia  - Patient noted to have elevated prolactin level in the 30s-50s.  - Patient had a tiny 2mm hypoenhancement focus showing a microadenoma   #Subclincal hypothyroidism - TSH mildly elevated to 4.36  - FT4 0.9  Prior pregnancies: Denies Plans for pregnancy: Denies   Hirsutism: Yes, on chin and abdomen  FH: Denies   History of diabetes? No   History of hypercholesterolemia? yes   Testo 8.0  HgbA1c of 5.2% TSH 2.73 FT4 0.7 Insulin 57  no plans for pregnancy   #Hypercholesterolemia Total cholesterol 257 HDL 40  prior to treatment -->100 after treatment  Started on Crestor 5mg

## 2024-01-24 LAB
ALBUMIN SERPL ELPH-MCNC: 5 G/DL
ALP BLD-CCNC: 90 U/L
ALT SERPL-CCNC: 27 U/L
ANION GAP SERPL CALC-SCNC: 13 MMOL/L
AST SERPL-CCNC: 29 U/L
BILIRUB SERPL-MCNC: 0.7 MG/DL
BUN SERPL-MCNC: 15 MG/DL
CALCIUM SERPL-MCNC: 9.7 MG/DL
CHLORIDE SERPL-SCNC: 101 MMOL/L
CHOLEST SERPL-MCNC: 166 MG/DL
CO2 SERPL-SCNC: 24 MMOL/L
CREAT SERPL-MCNC: 0.79 MG/DL
EGFR: 104 ML/MIN/1.73M2
ESTIMATED AVERAGE GLUCOSE: 97 MG/DL
GLUCOSE SERPL-MCNC: 113 MG/DL
HBA1C MFR BLD HPLC: 5 %
HCG SERPL-MCNC: <1 MIU/ML
HDLC SERPL-MCNC: 46 MG/DL
LDLC SERPL CALC-MCNC: 101 MG/DL
NONHDLC SERPL-MCNC: 120 MG/DL
POTASSIUM SERPL-SCNC: 4.2 MMOL/L
PROLACTIN SERPL-MCNC: 5.5 NG/ML
PROT SERPL-MCNC: 7.7 G/DL
SODIUM SERPL-SCNC: 138 MMOL/L
T4 FREE SERPL-MCNC: 0.9 NG/DL
TRIGL SERPL-MCNC: 106 MG/DL
TSH SERPL-ACNC: 4.25 UIU/ML

## 2024-01-24 RX ORDER — CABERGOLINE 0.5 MG/1
0.5 TABLET ORAL
Qty: 3 | Refills: 3 | Status: ACTIVE | COMMUNITY
Start: 2023-07-06 | End: 1900-01-01

## 2024-02-01 RX ORDER — LEVOTHYROXINE SODIUM 0.03 MG/1
25 TABLET ORAL
Qty: 30 | Refills: 3 | Status: ACTIVE | COMMUNITY
Start: 2024-02-01 | End: 1900-01-01

## 2024-03-04 ENCOUNTER — NON-APPOINTMENT (OUTPATIENT)
Age: 29
End: 2024-03-04

## 2024-03-04 DIAGNOSIS — R79.89 OTHER SPECIFIED ABNORMAL FINDINGS OF BLOOD CHEMISTRY: ICD-10-CM

## 2024-03-04 LAB
T3 SERPL-MCNC: 130 NG/DL
T4 FREE SERPL-MCNC: 0.8 NG/DL
TSH SERPL-ACNC: 1.88 UIU/ML

## 2024-03-04 RX ORDER — LEVOTHYROXINE SODIUM 0.03 MG/1
25 TABLET ORAL
Qty: 90 | Refills: 3 | Status: ACTIVE | COMMUNITY
Start: 2024-03-04 | End: 1900-01-01

## 2024-04-04 LAB
PROLACTIN SERPL-MCNC: 8.5 NG/ML
T4 FREE SERPL-MCNC: 1 NG/DL
TSH SERPL-ACNC: 2.34 UIU/ML

## 2024-04-24 ENCOUNTER — APPOINTMENT (OUTPATIENT)
Dept: ENDOCRINOLOGY | Facility: CLINIC | Age: 29
End: 2024-04-24
Payer: COMMERCIAL

## 2024-04-24 VITALS
HEART RATE: 83 BPM | SYSTOLIC BLOOD PRESSURE: 135 MMHG | DIASTOLIC BLOOD PRESSURE: 79 MMHG | BODY MASS INDEX: 33.66 KG/M2 | OXYGEN SATURATION: 98 % | HEIGHT: 63 IN | TEMPERATURE: 99.1 F | WEIGHT: 190 LBS

## 2024-04-24 DIAGNOSIS — E28.2 POLYCYSTIC OVARIAN SYNDROME: ICD-10-CM

## 2024-04-24 DIAGNOSIS — E22.1 HYPERPROLACTINEMIA: ICD-10-CM

## 2024-04-24 DIAGNOSIS — E03.8 OTHER SPECIFIED HYPOTHYROIDISM: ICD-10-CM

## 2024-04-24 PROCEDURE — 99214 OFFICE O/P EST MOD 30 MIN: CPT

## 2024-04-25 PROBLEM — E03.8 SUBCLINICAL HYPOTHYROIDISM: Status: ACTIVE | Noted: 2023-08-09

## 2024-04-25 PROBLEM — E22.1 HYPERPROLACTINEMIA: Status: ACTIVE | Noted: 2023-06-08

## 2024-04-25 LAB
ALBUMIN SERPL ELPH-MCNC: 4.6 G/DL
ALP BLD-CCNC: 77 U/L
ALT SERPL-CCNC: 21 U/L
ANION GAP SERPL CALC-SCNC: 10 MMOL/L
AST SERPL-CCNC: 23 U/L
BILIRUB SERPL-MCNC: 0.7 MG/DL
BUN SERPL-MCNC: 9 MG/DL
CALCIUM SERPL-MCNC: 9.4 MG/DL
CHLORIDE SERPL-SCNC: 101 MMOL/L
CHOLEST SERPL-MCNC: 154 MG/DL
CO2 SERPL-SCNC: 25 MMOL/L
CREAT SERPL-MCNC: 0.84 MG/DL
EGFR: 96 ML/MIN/1.73M2
ESTIMATED AVERAGE GLUCOSE: 94 MG/DL
GLUCOSE SERPL-MCNC: 92 MG/DL
HBA1C MFR BLD HPLC: 4.9 %
HDLC SERPL-MCNC: 46 MG/DL
LDLC SERPL CALC-MCNC: 85 MG/DL
NONHDLC SERPL-MCNC: 108 MG/DL
POTASSIUM SERPL-SCNC: 4 MMOL/L
PROT SERPL-MCNC: 7.7 G/DL
SODIUM SERPL-SCNC: 136 MMOL/L
T4 FREE SERPL-MCNC: 0.9 NG/DL
TRIGL SERPL-MCNC: 129 MG/DL
TSH SERPL-ACNC: 1.97 UIU/ML

## 2024-04-25 NOTE — HISTORY OF PRESENT ILLNESS
[FreeTextEntry1] : #Hypothyroidism - patient is on LT4 25mcg daily - TSH 2.34 in March 2024  #PCOS Diagnosis: 2019 when patient's periods became irregular. prior to that occasionally irregular  Menarche: 8 Period cycles:  on IUD - now getting periods regularly since starting cabergoline  Patient was on Ozempic for 4-6 months and lost 20lbs, didn't like it. stopped  On birth control? patient has IUD (Kylenna)  Previously patient was on MFM 1000mg  Follows with OB/GYN? June 2022  Last pelvic US: Previously found to have ring of pearls pattern on US in 2020.  Had one in Dec 2022   #Hyperprolactinemia  - Patient noted to have elevated prolactin level in the 30s-50s.  - Patient had a tiny 2mm hypoenhancement focus showing a microadenoma  - Has been on cabergoline 0.25mg weekly  - Last prolactin level was 8.5   Prior pregnancies: Denies Plans for pregnancy: Denies at thistime   Hirsutism: Yes, on chin and abdomen  FH: Denies   History of diabetes? No   History of hypercholesterolemia? yes   #Hypercholesterolemia Total cholesterol 257 HDL 40  prior to treatment -->101 Started on Crestor 5mg

## 2024-04-25 NOTE — ASSESSMENT
[FreeTextEntry1] : #Hyperprolactinemia - Continue with Cabergoline 0.25mg weekly  - Prolactin is at goal  - Periods now regular since starting the medication   #PCOS - Currently on IUD- didn't like how she felt on OCPs in the past -Tried metformin in the past however found to be ineffective - Didn't like Ozempic  - Lifestyle modiciations for now- periods more regular   #Subclincial hypothyroidism - Patient is now on LT4 25mcg daily  - TSH is at goal. would ocntinue   #Hypercholesterolemia - continue with Crestor 5mg daily. - Will get a lipid panel   Patient will be going to [place] next month for post-bach. Will continue care with us here when she is visiting home.

## 2024-08-19 ENCOUNTER — TRANSCRIPTION ENCOUNTER (OUTPATIENT)
Age: 29
End: 2024-08-19

## 2024-08-20 ENCOUNTER — TRANSCRIPTION ENCOUNTER (OUTPATIENT)
Age: 29
End: 2024-08-20

## 2024-12-23 ENCOUNTER — APPOINTMENT (OUTPATIENT)
Dept: ENDOCRINOLOGY | Facility: CLINIC | Age: 29
End: 2024-12-23
Payer: COMMERCIAL

## 2024-12-23 VITALS
BODY MASS INDEX: 31.01 KG/M2 | SYSTOLIC BLOOD PRESSURE: 121 MMHG | HEART RATE: 95 BPM | WEIGHT: 175 LBS | HEIGHT: 63 IN | OXYGEN SATURATION: 97 % | DIASTOLIC BLOOD PRESSURE: 73 MMHG

## 2024-12-23 DIAGNOSIS — E28.2 POLYCYSTIC OVARIAN SYNDROME: ICD-10-CM

## 2024-12-23 DIAGNOSIS — E03.8 OTHER SPECIFIED HYPOTHYROIDISM: ICD-10-CM

## 2024-12-23 DIAGNOSIS — E78.00 PURE HYPERCHOLESTEROLEMIA, UNSPECIFIED: ICD-10-CM

## 2024-12-23 PROCEDURE — 99214 OFFICE O/P EST MOD 30 MIN: CPT

## 2025-01-02 DIAGNOSIS — E22.1 HYPERPROLACTINEMIA: ICD-10-CM

## 2025-01-02 LAB
ALBUMIN SERPL ELPH-MCNC: 4.6 G/DL
ALP BLD-CCNC: 83 U/L
ALT SERPL-CCNC: 24 U/L
ANION GAP SERPL CALC-SCNC: 12 MMOL/L
AST SERPL-CCNC: 17 U/L
BILIRUB SERPL-MCNC: 0.6 MG/DL
BUN SERPL-MCNC: 11 MG/DL
CALCIUM SERPL-MCNC: 9.4 MG/DL
CHLORIDE SERPL-SCNC: 104 MMOL/L
CHOLEST SERPL-MCNC: 181 MG/DL
CO2 SERPL-SCNC: 25 MMOL/L
CREAT SERPL-MCNC: 0.67 MG/DL
EGFR: 121 ML/MIN/1.73M2
ESTIMATED AVERAGE GLUCOSE: 94 MG/DL
FSH SERPL-MCNC: 9.6 IU/L
GLUCOSE SERPL-MCNC: 114 MG/DL
HBA1C MFR BLD HPLC: 4.9 %
HDLC SERPL-MCNC: 53 MG/DL
LDLC SERPL CALC-MCNC: 98 MG/DL
LH SERPL-ACNC: 54.2 IU/L
NONHDLC SERPL-MCNC: 129 MG/DL
POTASSIUM SERPL-SCNC: 4.4 MMOL/L
PROLACTIN SERPL-MCNC: 9 NG/ML
PROT SERPL-MCNC: 7.4 G/DL
SODIUM SERPL-SCNC: 141 MMOL/L
T3 SERPL-MCNC: 97 NG/DL
T4 FREE SERPL-MCNC: 0.9 NG/DL
TRIGL SERPL-MCNC: 175 MG/DL
TSH SERPL-ACNC: 2.27 UIU/ML

## 2025-01-07 ENCOUNTER — TRANSCRIPTION ENCOUNTER (OUTPATIENT)
Age: 30
End: 2025-01-07

## 2025-03-14 ENCOUNTER — APPOINTMENT (OUTPATIENT)
Dept: MRI IMAGING | Facility: CLINIC | Age: 30
End: 2025-03-14

## 2025-03-14 ENCOUNTER — OUTPATIENT (OUTPATIENT)
Dept: OUTPATIENT SERVICES | Facility: HOSPITAL | Age: 30
LOS: 1 days | End: 2025-03-14
Payer: COMMERCIAL

## 2025-03-14 DIAGNOSIS — E22.1 HYPERPROLACTINEMIA: ICD-10-CM

## 2025-03-14 PROCEDURE — 70553 MRI BRAIN STEM W/O & W/DYE: CPT | Mod: 26

## 2025-03-14 PROCEDURE — 70553 MRI BRAIN STEM W/O & W/DYE: CPT

## 2025-03-14 PROCEDURE — A9585: CPT

## 2025-04-10 ENCOUNTER — TRANSCRIPTION ENCOUNTER (OUTPATIENT)
Age: 30
End: 2025-04-10

## 2025-04-10 DIAGNOSIS — E22.1 HYPERPROLACTINEMIA: ICD-10-CM

## 2025-04-25 ENCOUNTER — APPOINTMENT (OUTPATIENT)
Dept: ENDOCRINOLOGY | Facility: CLINIC | Age: 30
End: 2025-04-25
Payer: COMMERCIAL

## 2025-04-25 VITALS
HEIGHT: 63 IN | BODY MASS INDEX: 31.89 KG/M2 | HEART RATE: 87 BPM | DIASTOLIC BLOOD PRESSURE: 66 MMHG | OXYGEN SATURATION: 99 % | WEIGHT: 180 LBS | SYSTOLIC BLOOD PRESSURE: 106 MMHG

## 2025-04-25 DIAGNOSIS — E78.00 PURE HYPERCHOLESTEROLEMIA, UNSPECIFIED: ICD-10-CM

## 2025-04-25 DIAGNOSIS — E28.2 POLYCYSTIC OVARIAN SYNDROME: ICD-10-CM

## 2025-04-25 DIAGNOSIS — E03.8 OTHER SPECIFIED HYPOTHYROIDISM: ICD-10-CM

## 2025-04-25 DIAGNOSIS — E22.1 HYPERPROLACTINEMIA: ICD-10-CM

## 2025-04-25 PROCEDURE — 99214 OFFICE O/P EST MOD 30 MIN: CPT

## 2025-06-05 ENCOUNTER — TRANSCRIPTION ENCOUNTER (OUTPATIENT)
Age: 30
End: 2025-06-05

## 2025-06-10 ENCOUNTER — TRANSCRIPTION ENCOUNTER (OUTPATIENT)
Age: 30
End: 2025-06-10

## 2025-06-11 ENCOUNTER — TRANSCRIPTION ENCOUNTER (OUTPATIENT)
Age: 30
End: 2025-06-11

## 2025-08-06 ENCOUNTER — TRANSCRIPTION ENCOUNTER (OUTPATIENT)
Age: 30
End: 2025-08-06

## 2025-08-06 DIAGNOSIS — E22.1 HYPERPROLACTINEMIA: ICD-10-CM
